# Patient Record
Sex: FEMALE | Race: BLACK OR AFRICAN AMERICAN | NOT HISPANIC OR LATINO | URBAN - METROPOLITAN AREA
[De-identification: names, ages, dates, MRNs, and addresses within clinical notes are randomized per-mention and may not be internally consistent; named-entity substitution may affect disease eponyms.]

---

## 2019-06-04 VITALS
OXYGEN SATURATION: 100 % | HEART RATE: 82 BPM | SYSTOLIC BLOOD PRESSURE: 148 MMHG | TEMPERATURE: 97 F | RESPIRATION RATE: 16 BRPM | DIASTOLIC BLOOD PRESSURE: 52 MMHG | HEIGHT: 64 IN | WEIGHT: 274.92 LBS

## 2019-06-04 RX ORDER — CHLORHEXIDINE GLUCONATE 213 G/1000ML
1 SOLUTION TOPICAL ONCE
Refills: 0 | Status: DISCONTINUED | OUTPATIENT
Start: 2019-06-05 | End: 2019-06-05

## 2019-06-04 NOTE — H&P ADULT - NSICDXPASTMEDICALHX_GEN_ALL_CORE_FT
PAST MEDICAL HISTORY:  Afib     Asthma     Benign Essential Hypertension     Depression     Diabetes Mellitus     DVT (Deep Venous Thrombosis)     HF (Heart Failure) Right Heart Failure    Hyperlipidemia     Sleep Apnea PAST MEDICAL HISTORY:  Afib     Asthma     Benign Essential Hypertension     Depression     Diabetes Mellitus     DVT (Deep Venous Thrombosis)     GERD (gastroesophageal reflux disease)     HF (Heart Failure) diastolic CHF    Hyperlipidemia     Sleep Apnea On CPAP

## 2019-06-04 NOTE — H&P ADULT - HISTORY OF PRESENT ILLNESS
**SKELETON**    63 year old F with PMHx HTN, Dyslipidemia, DM Type II, Asthma, JORDON on CPAP, Normal Coronaries by diagnostic cardiac cath 7/18/12 at [facility], JUSTINO-Mohinder on Eliquis( last dose ?) who reports chest pain described as occasional non-radiating sharp heaviness or tightness unrelated to physical activity. Patient has exercise tolerance of one to low blocks limited secondary to severe osteoarthritis for which she ambulates with a cane. Pharmacological Stress Test 5/11/2019 revealed small sized, mild severity, inferior and inferolateral, rest defect suggestive of diaphragm artifact in the territory typical of distal RCA. Stress EF 59% and rest EF 66% with hypokinesis in the proximal to distal inferior and septal and distal lateral segments. Scan significance was normal and indicates a very low risk for hard cardiac events. Stress EKG was not ischemic and chest pain did not occur. Echocardiogram 4/23/19 revealed LVEF 60-65%, normal wall motion, no visible thrombus, impaired relaxation of LV diastolic filling, no significant valve disease, no pericardial effusion. **SKELETON**    63 year old F with PMHx HTN, Dyslipidemia, DM Type II, Asthma, JORDON on CPAP, Normal Coronaries by diagnostic cardiac cath 7/18/12 at [facility], JUSTINO-Mohinder on Eliquis( last dose ?) who reports chest pain described as occasional non-radiating sharp heaviness or tightness unrelated to physical activity. Patient has exercise tolerance of one to low blocks limited secondary to severe osteoarthritis for which she ambulates with a cane. Pharmacological Stress Test 5/11/2019 revealed small sized, mild severity, inferior and inferolateral, rest defect suggestive of diaphragm artifact in the territory typical of distal RCA. Stress EF 59% and rest EF 66% with hypokinesis in the proximal to distal inferior and septal and distal lateral segments. Scan significance was normal and indicates a very low risk for hard cardiac events. Stress EKG was not ischemic and chest pain did not occur. Echocardiogram 4/23/19 revealed LVEF 60-65%, normal wall motion, no visible thrombus, impaired relaxation of LV diastolic filling, no significant valve disease, no pericardial effusion.   In light of pt's risk factors, CCS Angina Class IV Sx, pt referred for cardiac cath with possible intervention to r/o underlying CAD. **SKELETON**    63 year old F with PMHx HTN, Dyslipidemia, chronic diastolic CHF, DM Type II, Asthma, JORDON on CPAP, Normal Coronaries by diagnostic cardiac cath 7/18/12 at [facility], A-Fib on Eliquis( last dose ?), history of DVT who reports chest pain described as occasional non-radiating sharp heaviness or tightness unrelated to physical activity. Patient has exercise tolerance of one to low blocks limited secondary to severe osteoarthritis for which she ambulates with a cane. Pharmacological Stress Test 5/11/2019 revealed small sized, mild severity, inferior and inferolateral, rest defect suggestive of diaphragm artifact in the territory typical of distal RCA. Stress EF 59% and rest EF 66% with hypokinesis in the proximal to distal inferior and septal and distal lateral segments. Scan significance was normal and indicates a very low risk for hard cardiac events. Stress EKG was not ischemic and chest pain did not occur. Echocardiogram 4/23/19 revealed LVEF 60-65%, normal wall motion, no visible thrombus, impaired relaxation of LV diastolic filling, no significant valve disease, no pericardial effusion.   In light of pt's risk factors, CCS Angina Class IV Sx, pt referred for cardiac cath with possible intervention to r/o underlying CAD. 63 year old F who walks with cane with PMHx HTN, Dyslipidemia, chronic diastolic CHF, DM Type II, Asthma (recent hospitalization for PNA 12/2018 for which she was intubated), JORDON (on CPAP), normal Coronaries by diagnostic cardiac cath 7/18/12 at [facility], h/o DVT and A-Fib on Eliquis(6/1/19), , GERD who presented to cardiologist with c/o SSCP radiating to left arm described as sharp and of 9/10 intensity with associated MENJIVAR and diasphoresis occurring independent of activity and lasting 10 minutes before self-resolving over past 3 weeks. Pt reports CP improved with SL NTG. Pt also endorses dizziness when standing up too quickly over past 2 months, generalized fatigue over past 4 months, intermittent palpitations over past 4-5 months. Furthermore, pt endorses 2 pillow orthopnea over past 2 years.   Patient has exercise tolerance of one to low blocks limited secondary to severe OA for which she ambulates with a cane. Denies LE edema, PND, syncope. NST 5/11/2019 revealed small sized, mild severity, inferior and inferolateral, rest defect suggestive of diaphragm artifact in the territory typical of distal RCA. Stress EF 59% and rest EF 66% with hypokinesis in the proximal to distal inferior and septal and distal lateral segments, scan significance was normal and indicates a very low risk for hard cardiac events, stress EKG was not ischemic and chest pain did not occur. ECHO 4/23/19 revealed LVEF 60-65%, normal wall motion, no visible thrombus, impaired relaxation of LV diastolic filling, no significant valve disease, no pericardial effusion. In light of pt's risk factors, CCS Angina Class IV Sx, pt referred for cardiac cath with possible intervention to r/o underlying CAD.

## 2019-06-04 NOTE — H&P ADULT - NSHPLABSRESULTS_GEN_ALL_CORE
12.9   4.51  )-----------( 238      ( 05 Jun 2019 07:30 )             41.5        PT/INR - ( 05 Jun 2019 07:30 )   PT: 12.5 sec;   INR: 1.10     PTT - ( 05 Jun 2019 07:30 )  PTT:31.1 sec      EKG: NSR @ 79 BPM with 1st degree AV block with TWI in AVF 12.9   4.51  )-----------( 238      ( 05 Jun 2019 07:30 )             41.5        PT/INR - ( 05 Jun 2019 07:30 )   PT: 12.5 sec;   INR: 1.10     PTT - ( 05 Jun 2019 07:30 )  PTT:31.1 sec    06-05    143  |  101  |  15  ----------------------------<  173<H>  4.5   |  32<H>  |  0.98    Ca    9.8      05 Jun 2019 07:30    TPro  7.4  /  Alb  4.1  /  TBili  0.4  /  DBili  <0.2  /  AST  15  /  ALT  11  /  AlkPhos  83  06-05      PT/INR - ( 05 Jun 2019 07:30 )   PT: 12.5 sec;   INR: 1.10          PTT - ( 05 Jun 2019 07:30 )  PTT:31.1 sec    EKG: NSR @ 79 BPM with 1st degree AV block with TWI in AVF

## 2019-06-04 NOTE — H&P ADULT - ASSESSMENT
63 year old F who walks with cane with PMHx HTN, Dyslipidemia, chronic diastolic CHF, DM Type II, Asthma (recent hospitalization for PNA 12/2018 for which she was intubated), JORDON (on CPAP), normal Coronaries by diagnostic cardiac cath 7/18/12 at [facility], h/o DVT and A-Fib on Eliquis(6/1/19), , GERD who presented to cardiologist with c/o SSCP radiating to left arm described as sharp and of 9/10 intensity with associated MENJIVAR and diaphoresis occurring independent of activity and lasting 10 minutes before self-resolving over past 3 weeks. In light of pt's risk factors, CCS Angina Class IV Sx, pt referred for cardiac cath with possible intervention to r/o underlying CAD.  ASA III, Mallampati IV  Pt denies bleeding, GI bleeding, hematemesis, hematuria, BRBPR or melena . Will load with  mg x1, Plavix 600 mg x1 pre-cath. Does not take ASA/Plavix at home.   IV NS@ 50 cc/hr pre-cath. Diastolic heart failure but euvolemic on exam.   Risks & benefits of procedure and alternative therapy have been explained to the patient including but not limited to: allergic reaction, bleeding w/possible need for blood transfusion, infection, renal and vascular compromise, limb damage, arrhythmia, stroke, vessel dissection/perforation, Myocardial infarction, emergent CABG. Informed consent obtained and in chart

## 2019-06-05 ENCOUNTER — OUTPATIENT (OUTPATIENT)
Dept: OUTPATIENT SERVICES | Facility: HOSPITAL | Age: 64
LOS: 1 days | Discharge: NURSING, REHAB OR LONG-TERM CARE | End: 2019-06-05
Payer: MEDICARE

## 2019-06-05 LAB
ALBUMIN SERPL ELPH-MCNC: 4.1 G/DL — SIGNIFICANT CHANGE UP (ref 3.3–5)
ALP SERPL-CCNC: 83 U/L — SIGNIFICANT CHANGE UP (ref 40–120)
ALT FLD-CCNC: 11 U/L — SIGNIFICANT CHANGE UP (ref 10–45)
ANION GAP SERPL CALC-SCNC: 10 MMOL/L — SIGNIFICANT CHANGE UP (ref 5–17)
APTT BLD: 31.1 SEC — SIGNIFICANT CHANGE UP (ref 27.5–36.3)
AST SERPL-CCNC: 15 U/L — SIGNIFICANT CHANGE UP (ref 10–40)
BASOPHILS # BLD AUTO: 0.01 K/UL — SIGNIFICANT CHANGE UP (ref 0–0.2)
BASOPHILS NFR BLD AUTO: 0.2 % — SIGNIFICANT CHANGE UP (ref 0–2)
BILIRUB DIRECT SERPL-MCNC: <0.2 MG/DL — SIGNIFICANT CHANGE UP (ref 0–0.2)
BILIRUB INDIRECT FLD-MCNC: >0.2 MG/DL — SIGNIFICANT CHANGE UP (ref 0.2–1)
BILIRUB SERPL-MCNC: 0.4 MG/DL — SIGNIFICANT CHANGE UP (ref 0.2–1.2)
BUN SERPL-MCNC: 15 MG/DL — SIGNIFICANT CHANGE UP (ref 7–23)
CALCIUM SERPL-MCNC: 9.8 MG/DL — SIGNIFICANT CHANGE UP (ref 8.4–10.5)
CHLORIDE SERPL-SCNC: 101 MMOL/L — SIGNIFICANT CHANGE UP (ref 96–108)
CHOLEST SERPL-MCNC: 220 MG/DL — HIGH (ref 10–199)
CO2 SERPL-SCNC: 32 MMOL/L — HIGH (ref 22–31)
CREAT SERPL-MCNC: 0.98 MG/DL — SIGNIFICANT CHANGE UP (ref 0.5–1.3)
CRP SERPL-MCNC: 0.71 MG/DL — HIGH (ref 0–0.4)
EOSINOPHIL # BLD AUTO: 0.1 K/UL — SIGNIFICANT CHANGE UP (ref 0–0.5)
EOSINOPHIL NFR BLD AUTO: 2.2 % — SIGNIFICANT CHANGE UP (ref 0–6)
GLUCOSE BLDC GLUCOMTR-MCNC: 139 MG/DL — HIGH (ref 70–99)
GLUCOSE BLDC GLUCOMTR-MCNC: 152 MG/DL — HIGH (ref 70–99)
GLUCOSE SERPL-MCNC: 173 MG/DL — HIGH (ref 70–99)
HCT VFR BLD CALC: 41.5 % — SIGNIFICANT CHANGE UP (ref 34.5–45)
HDLC SERPL-MCNC: 82 MG/DL — SIGNIFICANT CHANGE UP
HGB BLD-MCNC: 12.9 G/DL — SIGNIFICANT CHANGE UP (ref 11.5–15.5)
IMM GRANULOCYTES NFR BLD AUTO: 0.2 % — SIGNIFICANT CHANGE UP (ref 0–1.5)
INR BLD: 1.1 — SIGNIFICANT CHANGE UP (ref 0.88–1.16)
LIPID PNL WITH DIRECT LDL SERPL: 120 MG/DL — HIGH
LYMPHOCYTES # BLD AUTO: 1.37 K/UL — SIGNIFICANT CHANGE UP (ref 1–3.3)
LYMPHOCYTES # BLD AUTO: 30.4 % — SIGNIFICANT CHANGE UP (ref 13–44)
MCHC RBC-ENTMCNC: 29.9 PG — SIGNIFICANT CHANGE UP (ref 27–34)
MCHC RBC-ENTMCNC: 31.1 GM/DL — LOW (ref 32–36)
MCV RBC AUTO: 96.3 FL — SIGNIFICANT CHANGE UP (ref 80–100)
MONOCYTES # BLD AUTO: 0.46 K/UL — SIGNIFICANT CHANGE UP (ref 0–0.9)
MONOCYTES NFR BLD AUTO: 10.2 % — SIGNIFICANT CHANGE UP (ref 2–14)
NEUTROPHILS # BLD AUTO: 2.56 K/UL — SIGNIFICANT CHANGE UP (ref 1.8–7.4)
NEUTROPHILS NFR BLD AUTO: 56.8 % — SIGNIFICANT CHANGE UP (ref 43–77)
NRBC # BLD: 0 /100 WBCS — SIGNIFICANT CHANGE UP (ref 0–0)
PLATELET # BLD AUTO: 238 K/UL — SIGNIFICANT CHANGE UP (ref 150–400)
POTASSIUM SERPL-MCNC: 4.5 MMOL/L — SIGNIFICANT CHANGE UP (ref 3.5–5.3)
POTASSIUM SERPL-SCNC: 4.5 MMOL/L — SIGNIFICANT CHANGE UP (ref 3.5–5.3)
PROT SERPL-MCNC: 7.4 G/DL — SIGNIFICANT CHANGE UP (ref 6–8.3)
PROTHROM AB SERPL-ACNC: 12.5 SEC — SIGNIFICANT CHANGE UP (ref 10–12.9)
RBC # BLD: 4.31 M/UL — SIGNIFICANT CHANGE UP (ref 3.8–5.2)
RBC # FLD: 12.4 % — SIGNIFICANT CHANGE UP (ref 10.3–14.5)
SODIUM SERPL-SCNC: 143 MMOL/L — SIGNIFICANT CHANGE UP (ref 135–145)
TOTAL CHOLESTEROL/HDL RATIO MEASUREMENT: 2.7 RATIO — LOW (ref 3.3–7.1)
TRIGL SERPL-MCNC: 91 MG/DL — SIGNIFICANT CHANGE UP (ref 10–149)
WBC # BLD: 4.51 K/UL — SIGNIFICANT CHANGE UP (ref 3.8–10.5)
WBC # FLD AUTO: 4.51 K/UL — SIGNIFICANT CHANGE UP (ref 3.8–10.5)

## 2019-06-05 PROCEDURE — 85025 COMPLETE CBC W/AUTO DIFF WBC: CPT

## 2019-06-05 PROCEDURE — 80076 HEPATIC FUNCTION PANEL: CPT

## 2019-06-05 PROCEDURE — 93454 CORONARY ARTERY ANGIO S&I: CPT

## 2019-06-05 PROCEDURE — 86140 C-REACTIVE PROTEIN: CPT

## 2019-06-05 PROCEDURE — 80048 BASIC METABOLIC PNL TOTAL CA: CPT

## 2019-06-05 PROCEDURE — C1887: CPT

## 2019-06-05 PROCEDURE — 85730 THROMBOPLASTIN TIME PARTIAL: CPT

## 2019-06-05 PROCEDURE — 93454 CORONARY ARTERY ANGIO S&I: CPT | Mod: 26

## 2019-06-05 PROCEDURE — 82962 GLUCOSE BLOOD TEST: CPT

## 2019-06-05 PROCEDURE — 85610 PROTHROMBIN TIME: CPT

## 2019-06-05 PROCEDURE — 80061 LIPID PANEL: CPT

## 2019-06-05 PROCEDURE — C1769: CPT

## 2019-06-05 RX ORDER — MONTELUKAST 4 MG/1
1 TABLET, CHEWABLE ORAL
Qty: 0 | Refills: 0 | DISCHARGE

## 2019-06-05 RX ORDER — ASPIRIN/CALCIUM CARB/MAGNESIUM 324 MG
325 TABLET ORAL ONCE
Refills: 0 | Status: COMPLETED | OUTPATIENT
Start: 2019-06-05 | End: 2019-06-05

## 2019-06-05 RX ORDER — CLOPIDOGREL BISULFATE 75 MG/1
600 TABLET, FILM COATED ORAL ONCE
Refills: 0 | Status: COMPLETED | OUTPATIENT
Start: 2019-06-05 | End: 2019-06-05

## 2019-06-05 RX ORDER — DEXTROSE 50 % IN WATER 50 %
15 SYRINGE (ML) INTRAVENOUS ONCE
Refills: 0 | Status: DISCONTINUED | OUTPATIENT
Start: 2019-06-05 | End: 2019-06-05

## 2019-06-05 RX ORDER — ATORVASTATIN CALCIUM 80 MG/1
1 TABLET, FILM COATED ORAL
Qty: 0 | Refills: 0 | DISCHARGE

## 2019-06-05 RX ORDER — DEXTROSE 50 % IN WATER 50 %
12.5 SYRINGE (ML) INTRAVENOUS ONCE
Refills: 0 | Status: DISCONTINUED | OUTPATIENT
Start: 2019-06-05 | End: 2019-06-05

## 2019-06-05 RX ORDER — SODIUM CHLORIDE 9 MG/ML
500 INJECTION INTRAMUSCULAR; INTRAVENOUS; SUBCUTANEOUS
Refills: 0 | Status: DISCONTINUED | OUTPATIENT
Start: 2019-06-05 | End: 2019-06-05

## 2019-06-05 RX ORDER — APIXABAN 2.5 MG/1
1 TABLET, FILM COATED ORAL
Qty: 0 | Refills: 0 | DISCHARGE

## 2019-06-05 RX ORDER — METFORMIN HYDROCHLORIDE 850 MG/1
1 TABLET ORAL
Qty: 0 | Refills: 0 | DISCHARGE

## 2019-06-05 RX ORDER — DEXTROSE 50 % IN WATER 50 %
25 SYRINGE (ML) INTRAVENOUS ONCE
Refills: 0 | Status: DISCONTINUED | OUTPATIENT
Start: 2019-06-05 | End: 2019-06-05

## 2019-06-05 RX ORDER — NITROGLYCERIN 6.5 MG
1 CAPSULE, EXTENDED RELEASE ORAL
Qty: 0 | Refills: 0 | DISCHARGE

## 2019-06-05 RX ORDER — ALBUTEROL 90 UG/1
2 AEROSOL, METERED ORAL
Qty: 0 | Refills: 0 | DISCHARGE

## 2019-06-05 RX ORDER — GABAPENTIN 400 MG/1
1 CAPSULE ORAL
Qty: 0 | Refills: 0 | DISCHARGE

## 2019-06-05 RX ORDER — SODIUM CHLORIDE 9 MG/ML
1000 INJECTION, SOLUTION INTRAVENOUS
Refills: 0 | Status: DISCONTINUED | OUTPATIENT
Start: 2019-06-05 | End: 2019-06-05

## 2019-06-05 RX ORDER — INSULIN LISPRO 100/ML
VIAL (ML) SUBCUTANEOUS ONCE
Refills: 0 | Status: DISCONTINUED | OUTPATIENT
Start: 2019-06-05 | End: 2019-06-05

## 2019-06-05 RX ORDER — CHOLECALCIFEROL (VITAMIN D3) 125 MCG
1 CAPSULE ORAL
Qty: 0 | Refills: 0 | DISCHARGE

## 2019-06-05 RX ORDER — FLUTICASONE PROPIONATE 220 MCG
2 AEROSOL WITH ADAPTER (GRAM) INHALATION
Qty: 0 | Refills: 0 | DISCHARGE

## 2019-06-05 RX ORDER — GLUCAGON INJECTION, SOLUTION 0.5 MG/.1ML
1 INJECTION, SOLUTION SUBCUTANEOUS ONCE
Refills: 0 | Status: DISCONTINUED | OUTPATIENT
Start: 2019-06-05 | End: 2019-06-05

## 2019-06-05 RX ORDER — CARVEDILOL PHOSPHATE 80 MG/1
1 CAPSULE, EXTENDED RELEASE ORAL
Qty: 0 | Refills: 0 | DISCHARGE

## 2019-06-05 RX ADMIN — CLOPIDOGREL BISULFATE 600 MILLIGRAM(S): 75 TABLET, FILM COATED ORAL at 08:13

## 2019-06-05 RX ADMIN — SODIUM CHLORIDE 50 MILLILITER(S): 9 INJECTION INTRAMUSCULAR; INTRAVENOUS; SUBCUTANEOUS at 08:17

## 2019-06-05 RX ADMIN — Medication 325 MILLIGRAM(S): at 08:13

## 2019-06-05 NOTE — PROGRESS NOTE ADULT - SUBJECTIVE AND OBJECTIVE BOX
Interventional Cardiology PA SDA Discharge Note    Patient without complaints.     Afebrile, VSS    Ext:    		  Rt   Radial :  no  hematoma,   no  bleeding, dressing; C/D/I      Pulses:    intact RAD to baseline     A/P:        63 year old F who walks with cane with PMHx HTN, Dyslipidemia, chronic diastolic CHF, DM Type II, Asthma (recent hospitalization for PNA 12/2018 for which she was intubated), JORDON (on CPAP), normal Coronaries by diagnostic cardiac cath 7/18/12 at [facility], h/o DVT and A-Fib on Eliquis(6/1/19), , GERD who presented to cardiologist with c/o SSCP radiating to left arm described as sharp and of 9/10 intensity with associated MENJIVAR and diasphoresis occurring independent of activity and lasting 10 minutes before self-resolving over past 3 weeks. Pt reports CP improved with SL NTG. Pt also endorses dizziness when standing up too quickly over past 2 months, generalized fatigue over past 4 months, intermittent palpitations over past 4-5 months. Furthermore, pt endorses 2 pillow orthopnea over past 2 years.   Patient has exercise tolerance of one to low blocks limited secondary to severe OA for which she ambulates with a cane. Denies LE edema, PND, syncope. NST 5/11/2019 revealed small sized, mild severity, inferior and inferolateral, rest defect suggestive of diaphragm artifact in the territory typical of distal RCA. Stress EF 59% and rest EF 66% with hypokinesis in the proximal to distal inferior and septal and distal lateral segments, scan significance was normal and indicates a very low risk for hard cardiac events, stress EKG was not ischemic and chest pain did not occur. ECHO 4/23/19 revealed LVEF 60-65%, normal wall motion, no visible thrombus, impaired relaxation of LV diastolic filling, no significant valve disease, no pericardial effusion. In light of pt's risk factors, CCS Angina Class IV Sx, pt referred for cardiac cath with possible intervention to r/o underlying CAD.  Pt. s/p cardiac cath 6/5/19: Normal Coronary Arteries            1.	Stable for discharge as per attending  _Dagoberto________ after bed rest, pt voids, wrist stable and 30 minutes of ambulation.  2.	Follow-up with PMD/Cardiologist __Dr. ___Gopi______ in 1-2 weeks  3.	Discharged forms signed and copies in chart